# Patient Record
Sex: FEMALE | Race: BLACK OR AFRICAN AMERICAN
[De-identification: names, ages, dates, MRNs, and addresses within clinical notes are randomized per-mention and may not be internally consistent; named-entity substitution may affect disease eponyms.]

---

## 2017-01-31 ENCOUNTER — HOSPITAL ENCOUNTER (OUTPATIENT)
Dept: HOSPITAL 84 - D.ER | Age: 62
Setting detail: OBSERVATION
LOS: 2 days | Discharge: HOME | End: 2017-02-02
Attending: FAMILY MEDICINE | Admitting: FAMILY MEDICINE
Payer: MEDICAID

## 2017-01-31 VITALS
WEIGHT: 155.33 LBS | HEIGHT: 66 IN | WEIGHT: 155.33 LBS | HEIGHT: 66 IN | BODY MASS INDEX: 24.96 KG/M2 | BODY MASS INDEX: 24.96 KG/M2

## 2017-01-31 DIAGNOSIS — I10: ICD-10-CM

## 2017-01-31 DIAGNOSIS — R07.89: Primary | ICD-10-CM

## 2017-01-31 DIAGNOSIS — R94.31: ICD-10-CM

## 2017-01-31 DIAGNOSIS — K21.9: ICD-10-CM

## 2017-01-31 LAB
ALBUMIN SERPL-MCNC: 3.3 G/DL (ref 3.4–5)
ALP SERPL-CCNC: 105 U/L (ref 46–116)
ALT SERPL-CCNC: 41 U/L (ref 10–68)
AMYLASE SERPL-CCNC: 87 U/L (ref 25–115)
ANION GAP SERPL CALC-SCNC: 15.7 MMOL/L (ref 8–16)
BASOPHILS NFR BLD AUTO: 0.1 % (ref 0–2)
BILIRUB SERPL-MCNC: 0.86 MG/DL (ref 0.2–1.3)
BUN SERPL-MCNC: 10 MG/DL (ref 7–18)
CALCIUM SERPL-MCNC: 9.5 MG/DL (ref 8.5–10.1)
CHLORIDE SERPL-SCNC: 94 MMOL/L (ref 98–107)
CO2 SERPL-SCNC: 28.4 MMOL/L (ref 21–32)
CREAT SERPL-MCNC: 1 MG/DL (ref 0.6–1.3)
EOSINOPHIL NFR BLD: 0 % (ref 0–7)
ERYTHROCYTE [DISTWIDTH] IN BLOOD BY AUTOMATED COUNT: 13.9 % (ref 11.5–14.5)
GLOBULIN SER-MCNC: 5 G/L
GLUCOSE SERPL-MCNC: 142 MG/DL (ref 74–106)
HCT VFR BLD CALC: 47.7 % (ref 36–48)
HGB BLD-MCNC: 16.7 G/DL (ref 12–16)
IMM GRANULOCYTES NFR BLD: 0.3 % (ref 0–5)
LIPASE SERPL-CCNC: 80 U/L (ref 73–393)
LYMPHOCYTES NFR BLD AUTO: 10.8 % (ref 15–50)
MCH RBC QN AUTO: 28.1 PG (ref 26–34)
MCHC RBC AUTO-ENTMCNC: 35 G/DL (ref 31–37)
MCV RBC: 80.2 FL (ref 80–100)
MONOCYTES NFR BLD: 5.4 % (ref 2–11)
NEUTROPHILS NFR BLD AUTO: 83.4 % (ref 40–80)
NT-PROBNP SERPL-MCNC: 77 PG/ML (ref 0–125)
OSMOLALITY SERPL CALC.SUM OF ELEC: 270 MOSM/KG (ref 275–300)
PLATELET # BLD: 300 10X3/UL (ref 130–400)
PMV BLD AUTO: 9.3 FL (ref 7.4–10.4)
POTASSIUM SERPL-SCNC: 3.1 MMOL/L (ref 3.5–5.1)
PROT SERPL-MCNC: 8.3 G/DL (ref 6.4–8.2)
RBC # BLD AUTO: 5.95 10X6/UL (ref 4–5.4)
SODIUM SERPL-SCNC: 135 MMOL/L (ref 136–145)
TROPONIN I SERPL-MCNC: < 0.017 NG/ML (ref 0–0.06)
WBC # BLD AUTO: 11.2 10X3/UL (ref 4.8–10.8)

## 2017-01-31 NOTE — NUR
REPORT RECIVED FROM ART FROM ER REGARDING PT PT PRESENTED TO ER WITH C/O
N/V/D AND CHEST PAIN PT DENIES CHEST PAIN AT THIS TIME PER ART RN IN ER AND
TROPONIN NEGATIVE.  WILL REVIEW ER CHART AND EKG WHEN PT ARRIVES TO FLOOR

## 2017-01-31 NOTE — HEMODYNAMI
PATIENT:ALEIDA CR                                 MEDICAL RECORD: [id number]
: 55                                            LOCATION:[facility]     D.2116
[id number]                                       ADMISSION DATE: 17
 
 
 Generatedon:201713:42
Patient name: ALEIDA CR Patient #: [id number] Visit #: [id number] SSN: 43
0- :
1955 Date of study: 2017
Page: Of
Hemodynamic Procedure Report
****************************
Patient Data
Patient Demographics
Procedure consent was obtained
First Name: ALEIDA        Gender: Female
Last Name: SHAYE            : 1955
Middle Initial: A           Age: 61 year(s)
Patient #: [id number]       Race: Black
Visit #: [id number]
SSN: 
Accession #:
[id number]
Additional ID: [id number]
Contact details
Address: [address] [place] [address]   Phone: [phone]
State: [place]
City: [facility]
Zip code: [address]
Admission
Admission Data
Admission Date: 2017   Admission Time: 22:30
Admit Source: Other
Room #: D.2116
Height (in.): 65.75         BSA: 1.79 (m2)
Height (cm.): 167           BMI: 25.1 (kg/m2)
Weight (lbs.): 154.32
Weight (kg.): 70
Lab Results
Lab Result Date: 2017   Lab Result Time: 0:00
Biochemistry
Name         Units    Result                Min      Max
BUN          mg/dl    11       --(-*--)--   7        18
Creatinine   mg/dl    0.9      --(-*--)--   0.6      1.3
CBC
Name         Units    Result                Min      Max
Hemoglobin   g/dl     15.6     --(--*-)--   13.5     17.5
Procedure
Procedure Types
Cath Procedure
Diagnostic Procedure
[facility] w/Coronaries
Procedure Description
Procedure Date
Procedure Date: 2017
Procedure Start Time: 13:28
Procedure End Time: 13:40
Procedure Staff
 
Name                            Function
Dima Mercedes MD                   Performing Physician
Nancy Odonnell RT                    Scrub
Peg Hoffman RN               Nurse
Tony Payne RT                  Circulator
Courtney Ayon RT               Monitor
Procedure Data
Cath Procedure
Fluoroscopy
Diagnostic fluoroscopy      Total fluoroscopy Time: 2
time: 2 min                 min
Diagnostic fluoroscopy      Total fluoroscopy dose: 548
dose: 548 mGy               mGy
Contrast Material
Contrast Material Type                       Amount (ml)
Isovue 300                                   59
Entry Location
Entry     Primary  Successful  Side  Size  Upsize Upsize Entry    Closure Succes
sful  Closure
Location                             (Fr)  1 (Fr) 2 (Fr) Remarks  Device        
      Remarks
Femoral                        Right 5 Fr                         Exoseal
artery
Estimated blood loss: 10 ml
Diagnostic catheters
Device Type               Used For           End Catheter
Placement
Cordis 5Fr JL 4.0         Procedure
Catheter (MP)
Cordis 5Fr 3DRC Catheter  Procedure
(MP)
Cordis 5Fr Pigtail        Ventriculography
Catheter (MP)
Procedure Complications
No complications
Procedure Medications
Medication           Administration Route Dosage
Oxygen               NC                   2 l/min
Heparin Flush Bag    added to field       2 bags
(1000units/500ml NS)
Lidocaine 2%         added to field       20
Versed               I.V.                 1 mg
Fentanyl             I.V.                 50 mcg
Versed               I.V.                 1 mg
Fentanyl             I.V.                 50 mcg
Versed               I.V.                 1 mg
Fentanyl             I.V.                 50 mcg
Versed               I.V.                 0.5 mg
Fentanyl             I.V.                 25 mcg
Versed               I.V.                 0.5 mg
Fentanyl             I.V.                 25 mcg
Hemodynamics
Rest
BSA: 1.79 (m2) HGB: 15.6 (g/dl) O2 Consumption: Estimated: 189.45 (ml/min) O2 Co
nsumption
indexed: Estimated:105.84 (ml/min/m) Heart Rate: 100 (bpm)
Pressure Samples
Time     Site     Value (mmHg) Purpose      Heart      Use
Rate(bpm)
13:36    LV       118/21,10    Snapshot     100
 
13:37    LV       126/-19,5    Snapshot     100
13:37    AO       135/75(99)   Pullback     92
13:37    LV       140/-16,7    Pullback     92
Gradients
Valve  Time  Site 1    Site 2     Mean    SEP/DFP    Peak To Heart  Use
(mmHg)  (sec/min)  Peak    Rate
(mmHg)  (bpm)
Aortic 13:37 LV        AO         10      21         5       92
140/-16,7 135/75(99)
Calculations
Valve    P-P      Mean      Valve     Index  Valve    Source
Name              Gradient  Area             Flow
(cm2)
Aortic   5        10
5        10
Snapshots
Pre Cath      Intra         NCS           Post Cath
Vital Signs
Time     Heart  Resp   SPO2 NIBP (mmHg) Rhythm  Pain    Sedation
Rate   (ipm)  (%)                      Status  Level
(bpm)
13:11:31 105    26     100  157/98(119) NSR     0 (11)  10(A)
, No
pain
13:15:41 101    23     100  133/83(101) NSR     0 (11)  10(A)
, No
pain
13:19:50 99     20     100  126/82(99)  NSR     0 (11)  10(A)
, No
pain
13:24:00 100    20     100  114/76(92)  NSR     0 (11)  10(A)
, No
pain
13:28:08 98     16     100  105/68(87)  NSR     0 (11)  9(A)
, No
pain
13:32:10 100    17     100  114/74(94)  NSR     0 (11)  9(A)
, No
pain
13:36:16 97     15     100  115/75(89)  NSR     0 (11)  9(A)
, No
pain
13:40:15 101    16     100  117/72(94)  NSR     0 (11)  9(A)
, No
pain
Medications
Time     Medication       Route  Dose  Verified Delivered Reason    Notes  Effec
tiveness
by       by
13:13:58 Oxygen           NC     2     Dima     Peg   Per
l/min Daren Hoffman RN physician
13:14:05 Heparin Flush    added  2     Dima     Dima      used for
Bag              to     bags  Daren Mercedes MD  procedure
(1000units/500ml field
NS)
13:14:10 Lidocaine 2%     added  20ml  Dima     Dima      used for
to     vial  Daren Mercedes MD  procedure
field
13:16:51 Versed           I.V.   1 mg  Dima     Peg   for
Daren Hoffman RN sedation
 
13:17:01 Fentanyl         I.V.   50    Dima     Peg   for
mcg   Daren Hoffman RN sedation
13:20:23 Versed           I.V.   1 mg  Dima     Peg   for
Daren Hoffman RN sedation
13:20:27 Fentanyl         I.V.   50    Dima     Peg   for
mcg   Daren Hoffman RN sedation
13:25:33 Versed           I.V.   1 mg  Dima     Peg   for
Daren Hoffman RN sedation
13:25:36 Fentanyl         I.V.   50    Dima     Peg   for
mcg   Daren Hoffman RN sedation
13:28:59 Versed           I.V.   0.5   Dima     Peg   for
mg    Daren Hoffman RN sedation
13:29:03 Fentanyl         I.V.   25    Dima     Peg   for
mcg   Daren Hoffman RN sedation
13:33:00 Fentanyl         I.V.   25    Dima     Peg   for
mcg   Daren Hoffman RN sedation
13:33:48 Versed           I.V.   0.5   Dima     Peg   for
mg    Daren Hoffman RN sedation
Procedure Log
Time     Note
13:00:19 Patient Height : 167 inches
13:00:28 Patient Weight : 70 lbs
13:00:32 Admit Source: Other
13::54 Lab Result : Hemoglobin 15.6 g/dl
13::54 Lab Result : Creatinine 0.9 mg/dl
13::54 Lab Result : BUN 11 mg/dl
13:02:07 Diagnostic Cath Status : Elective
13:05:38 Tony Payne RT(R) sent for patient. Start room use.
13:05:39 Time tracking: Regular hours
13:05:45 Plan of Care:Hemodynamics will remain stable., Cardiac
rhythm will remain stable., Comfort level will be
maintained., Respiratory function will remain
adequate., Patient/ family verbilizes understanding of
procedure., Procedure tolerated without complication.,
Recovers from procedure without complications..
13:05:56 Patient received from Med II to CCL 1 Alert and
oriented. Tansferred to table in Supine position.
13:05:59 Warm blankets applied, and eliud hugger turned on for
patient comfort.
13:06:02 Correct patient and procedure confirmed by team.
13:06:04 Signed procedure consent form obtained from patient.
13:06:18 H&P Date Dictated: 2017 Within 30 days and on
chart..
13:06:21 Family in waiting room.
13:06:23 Patient NPO since Midnight.
13:10:30 ECG and BP/O2 sat monitors applied to patient.
13:10:31 Vital chart was started
13:10:36 Rhythm: sinus tachycardia
13:10:38 Full Disclosure recording started
13:10:41 Pre-procedure instructions explained to patient.
13:10:41 Pre-op teaching completed and patient verbalized
understanding.
13:10:44 Is the patient allergic to Iodine/contrast media? No.
13:10:58 Is patient on blood thinner?No
13:11:00 **ACC** The patient was administered the following
blood thiners within the last 24 hours: None
13:11:04 Patient diabetic? No.
13:11:09 Patient not pregnant. Patient is over age 55.
13:11:10 Previous problem with sedation/anesthesia? Yes nausea
13:11:11 Snore? Yes
 
13:11:12 Sleep apnea? No
13:11:13 Deviated septum? No
13:11:14 Opens mouth fully? Yes
13:11:15 Sticks out tongue? Yes
13:11:17 Airway obstruction? No ?
13:11:19 Dentures? No ?
13:11:36 Pre procedure: right dorsailis pedis pulse 1+
Palpable, but thready & weak; easily obliterated
13:11:57 Radial pulse too weak for access.
13:12:16 Patient pain scale 0/10 ?.
13:12:21 IV patent on arrival in left forearm with 0.9% NaCl at
[facility].
13:12:24 Lab results completed and on chart.
13:12:26 Right groin area was prepped with chlora-prep and
draped in sterile fashion
13:12:27 Alarms reviewed by R. N.
13:12:28 Sharps counted by scrub and verified by R.N.
13:13:58 Oxygen 2 l/min NC was given by Peg Hoffman RN; Per
physician;
13:14:05 Heparin Flush Bag (1000units/500ml NS) 2 bags added to
field was given by Dima Mercedes MD; used for procedure;
13:14:10 Lidocaine 2% 20ml vial added to field was given by
Dima Mercedes MD; used for procedure;
13:15:47 Physician paged
13:15:49 Physician arrived
13:15:49 --------ALL STOP TIME OUT------
13:15:50 Final Timeout: patient, procedure, and site verified
with staff and physician. All members of the team are
in agreement.
13:15:53 Right groin site verified by team.
13:15:56 Physical assessment completed. ASA score P 2 - A
patient with mild systemic disease as per Dima Mercedes MD.
13:16:01 Sedation plan: IV Moderate Sedation Versed, Fentanyl
13:16:51 Versed 1 mg I.V. was given by Peg Hoffman RN; for
sedation;
13:17:01 Fentanyl 50 mcg I.V. was given by Peg Hoffman RN;
for sedation;
13:17:08 Use device set Femoral Dx
13:17:09 Acist Syringe opened to sterile field.
13:17:09 Bag Decanter opened to sterile field.
13:17:10 Cardinal Cath Pack opened to sterile field.
13:17:10 Terumo 5Fr Pall Mall Sheath opened to sterile field.
13:17:11 St Balta 260cm J .035 wire opened to sterile field.
13:17:15 Acist Hand Control opened to sterile field.
13:17:15 Acist Manifold opened to sterile field.
13:17:16 Cordis Infinity 5Fr Multipack catheter opened to
sterile field.
13:17:16 Tegaderm 4 x 4 opened to sterile field.
13:20:23 Versed 1 mg I.V. was given by Peg Hoffman RN; for
sedation;
13:20:27 Fentanyl 50 mcg I.V. was given by Peg Hoffman RN;
for sedation;
13:25:33 Versed 1 mg I.V. was given by Peg Hoffman RN; for
sedation;
13:25:36 Fentanyl 50 mcg I.V. was given by Peg Hoffman RN;
for sedation;
13:28:25 Zero performed for pressure channel P1
13:28:35 Procedure started.
13:28:38 Local anesthetic to right femoral artery with
 
Lidocaine 2% by Dima Mercedes MD.**INITIAL ACCESS ONLY**
13:28:59 Versed 0.5 mg I.V. was given by Peg Hoffman RN; for
sedation;
13:29:03 Fentanyl 25 mcg I.V. was given by Peg Hoffman RN;
for sedation;
13:29:23 A 5 Fr sheath was inserted into the Right Femoral
artery
13:31:28 A Cordis 5Fr JL 4.0 Catheter (MP) was advanced over
the wire and used for Procedure.
13:32:01 LCA angiography performed.
13:33:00 Fentanyl 25 mcg I.V. was given by Peg Hoffman RN;
for sedation;
13:33:03 Catheter removed.
13:33:17 A Cordis 5Fr 3DRC Catheter (MP) was advanced over the
wire and used for Procedure.
13:33:21 RCA angiography performed.
13:33:48 Versed 0.5 mg I.V. was given by Peg Hoffman RN; for
sedation;
13:34:59 Catheter removed.
13:35:05 A Cordis 5Fr Pigtail Catheter (MP) was advanced over
the wire and used for Ventriculography.
13:35:16 LV gram done using ROSEN
13:35:22 EF : 55 %
13:37:47 Catheter removed.
13:37:58 Cordis 5Fr Exoseal opened to sterile field.
13:38:18 Sheath removed intact; hemostasis achieved with
Exoseal to the Right Femoral artery.
13:38:21 Procedure ended.(Physican Out)
13:38:55 Fluoroscopy time 02.00 minutes.
13:39:19 Fluoroscopy dose: 548 mGy
13:39:19 Flurop Dose total: 548
13:39:35 Contrast amount:Isovue 300 59ml.
13:39:37 Sharps counted by scrub and verified by R.N.
13:39:39 Insertion/operative site no bleeding no hematoma.
13:39:43 Post right femoral artery:stable
13:39:51 Post-procedure physical assessment completed. ASA
score P 2 - A patient with mild systemic disease as
per Dima Mercedes MD.
13:39:55 Post procedure rhythm: unchanged.
13:39:58 Estimated blood loss: 10 ml
13:40:01 Post procedure instruction explained to
patient.Patient verbalizes understanding.
13:40:10 Procedure type changed to Cath procedure, Diagnostic
procedure, LHC, LHC w/Coronaries
13:40:11 Procedure and supply charges have been captured,
reviewed, submitted and are correct.
13:40:37 Procedure Complication : No complications
13:40:40 Vital chart was stopped
13:40:41 See physician's report for complete and final results.
13:40:47 Patient transfered to [facility] with Bed.
13:40:50 Procedure ended.
13:40:50 Full Disclosure recording stopped
13:40:56 End room use (Document Last)
Device Usage
Item Name Manufacture  Quantity  Catalog     Hospital Part    Current Minimal Lo
t# /
Number      Charge   Number  Stock   Stock   Serial#
Code
Acist     Acist        1         [id number]       781099   548940  [id number]  20
Syringe   Medical
 
Systems Inc
Bag       Microtek     1         2002S       997445   09194   854765  5
DecMas [employer]
Cardinal  Cardinal     1         [address]  364605   49962   031171  5
Cath Pack Health
Terumo    Terumo       1         [id number]      095950   990326  989678  40
5Fr
Pall Mall
Sheath
St Balta   St Balta      1         350756      226676   422991  759923  30
260cm J
.035 wire
Acist     Acist        1         68478       539676   462709  108673  5
Hand      Medical
Control   Systems Inc
Acist     Acist        1         90231       553004   661755  875546  5
Manifold  Medical
Systems Inc
Cordis    Cardinal     1         [id number]      729537   51532   274609  30
[employer]  Health
5Fr
Multipack
catheter
Tegaderm             1         [address]26[address]       450903   016695  964466  5
4 x 4
Cordis    Cardinal     1                                      164546  5
5Fr JL    Health
4.0
Catheter
(MP)
Cordis    Cardinal     1                                      156032  5
5Fr 3DRC  Health
Catheter
(MP)
Cordis    Cardinal     1                                      034164  5
5Fr       Health
Pigtail
Catheter
(MP)
Cordis    Cardinal     1                537670   503316  648578  10
5Fr       Health
Exoseal
Signature Audit [place]
Stage           Time        Signature      Unsigned
Intra-Procedure 2017    Courtney Ayon
1:42:37 PM  RT(R)
Signatures
Monitor : Courtney Ayon Signature :
RT                       ______________________________
Date : ______________ Time :
______________
 
 
 
 
[address] [place] [address]

## 2017-02-01 VITALS — SYSTOLIC BLOOD PRESSURE: 128 MMHG | DIASTOLIC BLOOD PRESSURE: 80 MMHG

## 2017-02-01 VITALS — DIASTOLIC BLOOD PRESSURE: 70 MMHG | SYSTOLIC BLOOD PRESSURE: 110 MMHG

## 2017-02-01 VITALS — SYSTOLIC BLOOD PRESSURE: 133 MMHG | DIASTOLIC BLOOD PRESSURE: 84 MMHG

## 2017-02-01 VITALS — DIASTOLIC BLOOD PRESSURE: 84 MMHG | SYSTOLIC BLOOD PRESSURE: 140 MMHG

## 2017-02-01 VITALS — SYSTOLIC BLOOD PRESSURE: 134 MMHG | DIASTOLIC BLOOD PRESSURE: 80 MMHG

## 2017-02-01 VITALS — SYSTOLIC BLOOD PRESSURE: 141 MMHG | DIASTOLIC BLOOD PRESSURE: 83 MMHG

## 2017-02-01 LAB
ANION GAP SERPL CALC-SCNC: 13.1 MMOL/L (ref 8–16)
BASOPHILS NFR BLD AUTO: 0.1 % (ref 0–2)
BUN SERPL-MCNC: 11 MG/DL (ref 7–18)
CALCIUM SERPL-MCNC: 9 MG/DL (ref 8.5–10.1)
CHLORIDE SERPL-SCNC: 97 MMOL/L (ref 98–107)
CO2 SERPL-SCNC: 32.3 MMOL/L (ref 21–32)
CREAT SERPL-MCNC: 0.9 MG/DL (ref 0.6–1.3)
EOSINOPHIL NFR BLD: 0.2 % (ref 0–7)
ERYTHROCYTE [DISTWIDTH] IN BLOOD BY AUTOMATED COUNT: 14 % (ref 11.5–14.5)
GLUCOSE SERPL-MCNC: 103 MG/DL (ref 74–106)
HCT VFR BLD CALC: 45.6 % (ref 36–48)
HGB BLD-MCNC: 15.6 G/DL (ref 12–16)
IMM GRANULOCYTES NFR BLD: 0.2 % (ref 0–5)
LYMPHOCYTES NFR BLD AUTO: 14.5 % (ref 15–50)
MCH RBC QN AUTO: 28.1 PG (ref 26–34)
MCHC RBC AUTO-ENTMCNC: 34.2 G/DL (ref 31–37)
MCV RBC: 82 FL (ref 80–100)
MONOCYTES NFR BLD: 8.3 % (ref 2–11)
NEUTROPHILS NFR BLD AUTO: 76.7 % (ref 40–80)
OSMOLALITY SERPL CALC.SUM OF ELEC: 276 MOSM/KG (ref 275–300)
PLATELET # BLD: 285 10X3/UL (ref 130–400)
PMV BLD AUTO: 9.8 FL (ref 7.4–10.4)
POTASSIUM SERPL-SCNC: 3.4 MMOL/L (ref 3.5–5.1)
RBC # BLD AUTO: 5.56 10X6/UL (ref 4–5.4)
SODIUM SERPL-SCNC: 139 MMOL/L (ref 136–145)
WBC # BLD AUTO: 9.6 10X3/UL (ref 4.8–10.8)

## 2017-02-01 NOTE — NUR
RESUMED CARE OF PT, LYING IN BED RESPIRATIONS EVEN AND UNLABORED ON ROOM AIR.
RIGHT AC SALINE LOCKED.  87 SR ON TELEMETRY.  RIGHT GROIN C/D/I.  PLAN OF CARE
DISCUSSED.  CALL LIGHT IN REACH.  SEE NURSE ASSESSMENT.  WILL CONTINUE TO
MONITOR.

## 2017-02-02 VITALS — SYSTOLIC BLOOD PRESSURE: 135 MMHG | DIASTOLIC BLOOD PRESSURE: 78 MMHG

## 2017-02-02 VITALS — DIASTOLIC BLOOD PRESSURE: 91 MMHG | SYSTOLIC BLOOD PRESSURE: 138 MMHG

## 2017-02-02 VITALS — SYSTOLIC BLOOD PRESSURE: 152 MMHG | DIASTOLIC BLOOD PRESSURE: 93 MMHG

## 2017-02-02 VITALS — SYSTOLIC BLOOD PRESSURE: 146 MMHG | DIASTOLIC BLOOD PRESSURE: 87 MMHG

## 2017-02-02 NOTE — HP
PATIENT: ALEIDA CR                                MEDICAL RECORD: [id number]
ACCOUNT: [id number]                                    LOCATION:[address]
: 55                                            ADMISSION DATE: 17
                                                         
 
                             HISTORY AND PHYSICAL EXAMINATION
 
 
Admission History and Physical
 
DATE OF ADMISSION:  2017
 
CHIEF COMPLAINT:  Chest pain.
 
HISTORY OF PRESENT ILLNESS:  This is a 61-year-old  female who
presented with acute onset of nausea, vomiting and some diarrhea that started
the morning of  then, started having chest pain later in the day as
a burning type achy pain in the center of her chest.  She states this is
different from her reflux and does not radiate out to either arm; however, she
has continued to have some chest pain while here in the hospital.  EKG showed
some anterior and lateral T-wave changes.  She does not have a history of
cardiac problems.  She is admitted for further evaluation.
 
PAST MEDICAL AND SURGICAL HISTORY:  She has hypertension and reflux.
 
ALLERGIES:  No known drug allergies.
 
PAST SURGICAL HISTORY:  Hysterectomy.
 
HOME MEDICATIONS:  Amlodipine 2.5 mg once a day, lisinopril/HCTZ 20/25 once a
day.
 
HABITS:  She does smoke every day.  She drinks a couple of beers a day.  Denies
any ____.  She admits to occasional marijuana.
 
SOCIAL HISTORY:  She is  and unemployed.
 
FAMILY HISTORY:  Her father , but she really does not know much about his
history.  Her mother is alive at 87.  She has glaucoma, arthritis and patient
thinks that her mother has a history of some heart trouble.  Sisters have high
blood pressure, and one has breast cancer.
 
REVIEW OF SYSTEMS:
GENERAL:  No major weight changes.
HEENT:  No particular sinus or allergy problems.
RESPIRATORY:  No history of asthma or emphysema.
CARDIAC:  No history of coronary artery disease.
GASTROINTESTINAL:  She has reflux.
GENITOURINARY:  No significant problems there.
MUSCULOSKELETAL:  No significant joint aches and pains.
NEUROLOGIC:  No seizures or migraine headaches.
PSYCHIATRIC:  Denies depression or melancholia.
 
PHYSICAL EXAMINATION:
VITAL SIGNS:  Temperature is 98.1, pulse 96, respirations 18, blood pressure
133/84 and O2 sat 99% on room air.
GENERAL:  She does not appear to be in acute distress.  She is awake and alert.
HEENT:  Grossly within normal limits.
 
 
 
HISTORY AND PHYSICAL                           [id number]    CR,ALEIDA A        
 
 
NECK:  Supple.
HEART:  Regular rate and rhythm without murmur.
LUNGS:  Fairly clear.
ABDOMEN:  Soft, flat, nontender.
EXTREMITIES:  No edema.
NEUROLOGIC:  Unremarkable.
 
LABORATORY DATA:  EKG shows sinus tachycardia with a rate of 105 and there are
some anterior and lateral T-wave changes that are nonspecific.  CBC is normal. 
Cardiac enzymes are normal.  LFTs are okay.  Basic metabolic panel is okay
except potassium a little low at 3.1.
 
ASSESSMENT:
1.  Chest pain.
2.  Nausea and vomiting.
3.  History of hypertension.
 
PLAN:  Cardiology has been consulted and she will be taken to the cath lab later
today.  Other tests and procedures as warranted.
 
TRANSINT:[id number] Voice Confirmation ID: [id number] DOCUMENT ID: [id number]
 
 
                                           
                                           MALIA VASQUEZ MD              
 
 
 
Electronically Signed by MALIA VASQUEZ on 17 at 1842
 
 
 
 
 
 
 
 
 
 
 
 
 
 
 
 
 
 
CC:                                                             [id number]
DICTATION DATE: 17 1414     :     17 1508      ADM IN  
                                                                              
[id number] [place] [address]

## 2017-02-02 NOTE — NUR
DISCHARGE PER DR VASQUEZ, INSTRUCTIONS COMPLETE.  IV REMOVED WITH TIP INTACT.
REMOVED FROM TELEMETRY.

## 2017-02-08 NOTE — OP
PATIENT NAME:  ALEIDA CR                          MEDICAL RECORD: [id number]
:55                                             LOCATION:D.     D.2116
                                                         ADMISSION DATE:17
SURGEON:  CARMEN KEENAN M.D.          
 
 
DATE OF OPERATION:  2017
 
REFERRING PHYSICIAN:  Braeden Gonzalez MD.
 
PROCEDURES PERFORMED:
1.  Selective coronary angiography.
2.  Left heart catheterization with ventriculogram.
 
INDICATION:  A 61-year-old who presents with symptoms of chest pain and abnormal
EKG.
 
EQUIPMENT USED:  A 5-Austrian JL4, Turner right, pigtail catheter.
 
TECHNIQUE:  A 5-Austrian sheath was inserted in retrograde fashion in the right
common femoral artery.  Next, selective coronary angiography was performed in
standard views using 5-Austrian JL4 and Turner right.  Left heart
catheterization was performed using pigtail catheter.
 
CORONARY ANATOMY:
1.  Left main:  Left main trunk is large in caliber.  It gives rise to the LAD
and circumflex.  There is no obstruction.
2.  LAD:  This is a large-caliber vessel extending to the apex.  It is a
smooth-walled vessel and angiographically normal.
3.  Circumflex:  This vessel is large in caliber.  It supplies the lateral
branch in distal segment.  The circumflex and lateral branch are smooth-walled
vessels and angiographically normal.
4.  Right coronary:  This vessel is large in caliber and dominant.  It supplies
the PDA and posterolateral branch in distal segment.  This vessel is
smooth-walled and angiographically normal.
5.  Left ventricle:  Left ventricle is normal in size and function.  No wall
motion abnormalities are seen.  Estimated ejection fraction is 55%.
 
IMPRESSION:
1.  Normal coronary arteries.
2.  Normal left ventricular function.
 
TRANSINT:[id number] Voice Confirmation ID: [id number] DOCUMENT ID: [id number]
                                           
                                           CARMEN KEENAN M.D.          
 
 
 
Electronically Signed by CARMEN KEENAN on 17 at 1316
 
CC:                                                             [id number]
DICTATION DATE: 17 1344     :     17 1902      DIS IN  
                                                                      17
[facility]                                          
[address] [place] [address]

## 2018-11-13 ENCOUNTER — HOSPITAL ENCOUNTER (OUTPATIENT)
Dept: HOSPITAL 84 - D.US | Age: 63
Discharge: HOME | End: 2018-11-13
Attending: FAMILY MEDICINE
Payer: MEDICAID

## 2018-11-13 VITALS — BODY MASS INDEX: 25 KG/M2

## 2018-11-13 DIAGNOSIS — R10.11: Primary | ICD-10-CM

## 2020-06-01 ENCOUNTER — HOSPITAL ENCOUNTER (INPATIENT)
Dept: HOSPITAL 84 - D.ER | Age: 65
LOS: 2 days | Discharge: HOME | DRG: 392 | End: 2020-06-03
Attending: FAMILY MEDICINE | Admitting: FAMILY MEDICINE
Payer: COMMERCIAL

## 2020-06-01 VITALS
HEIGHT: 66 IN | BODY MASS INDEX: 38.57 KG/M2 | BODY MASS INDEX: 38.57 KG/M2 | WEIGHT: 240 LBS | BODY MASS INDEX: 38.57 KG/M2

## 2020-06-01 VITALS — DIASTOLIC BLOOD PRESSURE: 73 MMHG | SYSTOLIC BLOOD PRESSURE: 135 MMHG

## 2020-06-01 VITALS — DIASTOLIC BLOOD PRESSURE: 84 MMHG | SYSTOLIC BLOOD PRESSURE: 156 MMHG

## 2020-06-01 DIAGNOSIS — F17.200: ICD-10-CM

## 2020-06-01 DIAGNOSIS — I10: ICD-10-CM

## 2020-06-01 DIAGNOSIS — K57.92: Primary | ICD-10-CM

## 2020-06-01 LAB
ALBUMIN SERPL-MCNC: 2.9 G/DL (ref 3.4–5)
ALP SERPL-CCNC: 97 U/L (ref 30–120)
ALT SERPL-CCNC: 19 U/L (ref 10–68)
AMYLASE SERPL-CCNC: 56 U/L (ref 25–115)
ANION GAP SERPL CALC-SCNC: 11.9 MMOL/L (ref 8–16)
BASOPHILS NFR BLD AUTO: 0.1 % (ref 0–2)
BILIRUB SERPL-MCNC: 0.59 MG/DL (ref 0.2–1.3)
BUN SERPL-MCNC: 6 MG/DL (ref 7–18)
CALCIUM SERPL-MCNC: 9.5 MG/DL (ref 8.5–10.1)
CHLORIDE SERPL-SCNC: 94 MMOL/L (ref 98–107)
CO2 SERPL-SCNC: 30.8 MMOL/L (ref 21–32)
CREAT SERPL-MCNC: 0.8 MG/DL (ref 0.6–1.3)
EOSINOPHIL NFR BLD: 1.6 % (ref 0–7)
ERYTHROCYTE [DISTWIDTH] IN BLOOD BY AUTOMATED COUNT: 15.5 % (ref 11.5–14.5)
GLOBULIN SER-MCNC: 6 G/L
GLUCOSE SERPL-MCNC: 113 MG/DL (ref 74–106)
HCT VFR BLD CALC: 37.3 % (ref 36–48)
HGB BLD-MCNC: 12.1 G/DL (ref 12–16)
IMM GRANULOCYTES NFR BLD: 0.1 % (ref 0–5)
LIPASE SERPL-CCNC: 54 U/L (ref 73–393)
LYMPHOCYTES NFR BLD AUTO: 25.3 % (ref 15–50)
MCH RBC QN AUTO: 24.2 PG (ref 26–34)
MCHC RBC AUTO-ENTMCNC: 32.4 G/DL (ref 31–37)
MCV RBC: 74.7 FL (ref 80–100)
MONOCYTES NFR BLD: 7 % (ref 2–11)
NEUTROPHILS NFR BLD AUTO: 65.9 % (ref 40–80)
OSMOLALITY SERPL CALC.SUM OF ELEC: 263 MOSM/KG (ref 275–300)
PLATELET # BLD: 406 10X3/UL (ref 130–400)
PMV BLD AUTO: 9.5 FL (ref 7.4–10.4)
POTASSIUM SERPL-SCNC: 4.7 MMOL/L (ref 3.5–5.1)
PROT SERPL-MCNC: 8.9 G/DL (ref 6.4–8.2)
RBC # BLD AUTO: 4.99 10X6/UL (ref 4–5.4)
SODIUM SERPL-SCNC: 132 MMOL/L (ref 136–145)
WBC # BLD AUTO: 8.1 10X3/UL (ref 4.8–10.8)

## 2020-06-02 VITALS — DIASTOLIC BLOOD PRESSURE: 93 MMHG | SYSTOLIC BLOOD PRESSURE: 157 MMHG

## 2020-06-02 VITALS — DIASTOLIC BLOOD PRESSURE: 61 MMHG | SYSTOLIC BLOOD PRESSURE: 119 MMHG

## 2020-06-02 VITALS — SYSTOLIC BLOOD PRESSURE: 136 MMHG | DIASTOLIC BLOOD PRESSURE: 68 MMHG

## 2020-06-02 VITALS — DIASTOLIC BLOOD PRESSURE: 82 MMHG | SYSTOLIC BLOOD PRESSURE: 157 MMHG

## 2020-06-02 VITALS — DIASTOLIC BLOOD PRESSURE: 68 MMHG | SYSTOLIC BLOOD PRESSURE: 127 MMHG

## 2020-06-02 VITALS — SYSTOLIC BLOOD PRESSURE: 107 MMHG | DIASTOLIC BLOOD PRESSURE: 73 MMHG

## 2020-06-02 LAB
ALBUMIN SERPL-MCNC: 2.6 G/DL (ref 3.4–5)
ALP SERPL-CCNC: 84 U/L (ref 30–120)
ALT SERPL-CCNC: 14 U/L (ref 10–68)
ANION GAP SERPL CALC-SCNC: 8.9 MMOL/L (ref 8–16)
BACTERIA #/AREA URNS HPF: (no result) /HPF
BASOPHILS NFR BLD AUTO: 0.2 % (ref 0–2)
BILIRUB SERPL-MCNC: 0.31 MG/DL (ref 0.2–1.3)
BILIRUB SERPL-MCNC: NEGATIVE MG/DL
BUN SERPL-MCNC: 6 MG/DL (ref 7–18)
CALCIUM SERPL-MCNC: 8.7 MG/DL (ref 8.5–10.1)
CHLORIDE SERPL-SCNC: 98 MMOL/L (ref 98–107)
CO2 SERPL-SCNC: 31.1 MMOL/L (ref 21–32)
CREAT SERPL-MCNC: 0.9 MG/DL (ref 0.6–1.3)
EOSINOPHIL NFR BLD: 2 % (ref 0–7)
ERYTHROCYTE [DISTWIDTH] IN BLOOD BY AUTOMATED COUNT: 15.4 % (ref 11.5–14.5)
GLOBULIN SER-MCNC: 4.8 G/L
GLUCOSE SERPL-MCNC: 103 MG/DL (ref 74–106)
GLUCOSE SERPL-MCNC: NEGATIVE MG/DL
HCT VFR BLD CALC: 34.8 % (ref 36–48)
HGB BLD-MCNC: 11.3 G/DL (ref 12–16)
IMM GRANULOCYTES NFR BLD: 0.3 % (ref 0–5)
KETONES UR STRIP-MCNC: NEGATIVE MG/DL
LYMPHOCYTES NFR BLD AUTO: 26.1 % (ref 15–50)
MCH RBC QN AUTO: 24.7 PG (ref 26–34)
MCHC RBC AUTO-ENTMCNC: 32.5 G/DL (ref 31–37)
MCV RBC: 76 FL (ref 80–100)
MONOCYTES NFR BLD: 7.4 % (ref 2–11)
NEUTROPHILS NFR BLD AUTO: 64 % (ref 40–80)
NITRITE UR-MCNC: NEGATIVE MG/ML
OSMOLALITY SERPL CALC.SUM OF ELEC: 267 MOSM/KG (ref 275–300)
PH UR STRIP: 7 [PH] (ref 5–6)
PLATELET # BLD: 347 10X3/UL (ref 130–400)
PMV BLD AUTO: 8.7 FL (ref 7.4–10.4)
POTASSIUM SERPL-SCNC: 3 MMOL/L (ref 3.5–5.1)
PROT SERPL-MCNC: 7.4 G/DL (ref 6.4–8.2)
RBC # BLD AUTO: 4.58 10X6/UL (ref 4–5.4)
SODIUM SERPL-SCNC: 135 MMOL/L (ref 136–145)
SP GR UR STRIP: 1.01 (ref 1–1.02)
UROBILINOGEN UR-MCNC: NORMAL MG/DL
WBC # BLD AUTO: 6.6 10X3/UL (ref 4.8–10.8)
WBC #/AREA URNS HPF: (no result) /HPF

## 2020-06-02 NOTE — NUR
ASSESSMENT PER FLOW SHEET. PATIENT IS WITHOUT DISTRESS.SHE DENIES NEEDS AT
PRESENT. CALL LIGHT IN REACH.

## 2020-06-02 NOTE — NUR
PT STATES SHE HAD A SOFT BM. AND NOW HAS SOME NAUSEA. GIVEN IV ZOFRAN. WILL
FALLOW UP. CALL LIGHT IN REACH. BED LOWERED AND LOCKED.

## 2020-06-03 VITALS — SYSTOLIC BLOOD PRESSURE: 149 MMHG | DIASTOLIC BLOOD PRESSURE: 70 MMHG

## 2020-06-03 VITALS — DIASTOLIC BLOOD PRESSURE: 86 MMHG | SYSTOLIC BLOOD PRESSURE: 157 MMHG

## 2020-06-03 VITALS — SYSTOLIC BLOOD PRESSURE: 139 MMHG | DIASTOLIC BLOOD PRESSURE: 72 MMHG

## 2020-06-03 VITALS — SYSTOLIC BLOOD PRESSURE: 125 MMHG | DIASTOLIC BLOOD PRESSURE: 72 MMHG

## 2020-06-03 VITALS — DIASTOLIC BLOOD PRESSURE: 68 MMHG | SYSTOLIC BLOOD PRESSURE: 143 MMHG

## 2020-06-03 LAB
ANION GAP SERPL CALC-SCNC: 7.8 MMOL/L (ref 8–16)
BUN SERPL-MCNC: 4 MG/DL (ref 7–18)
CALCIUM SERPL-MCNC: 8.5 MG/DL (ref 8.5–10.1)
CHLORIDE SERPL-SCNC: 106 MMOL/L (ref 98–107)
CO2 SERPL-SCNC: 28.8 MMOL/L (ref 21–32)
CREAT SERPL-MCNC: 0.8 MG/DL (ref 0.6–1.3)
GLUCOSE SERPL-MCNC: 97 MG/DL (ref 74–106)
OSMOLALITY SERPL CALC.SUM OF ELEC: 274 MOSM/KG (ref 275–300)
POTASSIUM SERPL-SCNC: 3.6 MMOL/L (ref 3.5–5.1)
SODIUM SERPL-SCNC: 139 MMOL/L (ref 136–145)

## 2020-06-03 NOTE — NUR
PATIENT TOLERATED REGULAR DIET. DC INSTRUCTIONS GIVEN TO PATIENT. VERBALIZED
UNDERSTANDING. WAITING FOR TRANSPORTATION FOR DC. CALL LIGHT WITHIN REACH.

## 2020-06-03 NOTE — NUR
PT RESTING IN BED. EYES CLOSED. NO SIGNS OF DISTRESS. BREATHING EVEN AND
ULABORED. IV SITE RT AC DRESSING CLEAN DRY AND INTACT. NO SIGNS OF INFECTION
OR INFULTRATION. SKIN CLEAN DRY AND INTACT. LUNG SOUNDS CLEAR. BOWEL SOUNDS
ACTIVE. WILL CONTINUE PLAN OF CARE. CALL LIGHT IN REACH. BED LOWERED AND
LOCKED. BED RAILS UPX2.